# Patient Record
Sex: FEMALE | Race: OTHER | HISPANIC OR LATINO | URBAN - METROPOLITAN AREA
[De-identification: names, ages, dates, MRNs, and addresses within clinical notes are randomized per-mention and may not be internally consistent; named-entity substitution may affect disease eponyms.]

---

## 2024-04-07 ENCOUNTER — INPATIENT (INPATIENT)
Facility: HOSPITAL | Age: 89
LOS: 1 days | Discharge: HOME HEALTH | DRG: 563 | End: 2024-04-09
Attending: SURGERY | Admitting: SURGERY
Payer: MEDICARE

## 2024-04-07 VITALS
HEIGHT: 55 IN | TEMPERATURE: 98 F | DIASTOLIC BLOOD PRESSURE: 76 MMHG | HEART RATE: 85 BPM | RESPIRATION RATE: 18 BRPM | OXYGEN SATURATION: 97 % | WEIGHT: 106.92 LBS | SYSTOLIC BLOOD PRESSURE: 186 MMHG

## 2024-04-07 DIAGNOSIS — S22.49XA MULTIPLE FRACTURES OF RIBS, UNSPECIFIED SIDE, INITIAL ENCOUNTER FOR CLOSED FRACTURE: ICD-10-CM

## 2024-04-07 LAB
ABO RH CONFIRMATION: SIGNIFICANT CHANGE UP
ALBUMIN SERPL ELPH-MCNC: 3.2 G/DL — LOW (ref 3.3–5)
ALP SERPL-CCNC: 100 U/L — SIGNIFICANT CHANGE UP (ref 40–120)
ALT FLD-CCNC: 17 U/L — SIGNIFICANT CHANGE UP (ref 12–78)
ANION GAP SERPL CALC-SCNC: 6 MMOL/L — SIGNIFICANT CHANGE UP (ref 5–17)
APTT BLD: 35.3 SEC — SIGNIFICANT CHANGE UP (ref 24.5–35.6)
AST SERPL-CCNC: 23 U/L — SIGNIFICANT CHANGE UP (ref 15–37)
BASOPHILS # BLD AUTO: 0.02 K/UL — SIGNIFICANT CHANGE UP (ref 0–0.2)
BASOPHILS NFR BLD AUTO: 0.2 % — SIGNIFICANT CHANGE UP (ref 0–2)
BILIRUB SERPL-MCNC: 0.5 MG/DL — SIGNIFICANT CHANGE UP (ref 0.2–1.2)
BLD GP AB SCN SERPL QL: SIGNIFICANT CHANGE UP
BUN SERPL-MCNC: 20 MG/DL — SIGNIFICANT CHANGE UP (ref 7–23)
CALCIUM SERPL-MCNC: 9.4 MG/DL — SIGNIFICANT CHANGE UP (ref 8.5–10.1)
CHLORIDE SERPL-SCNC: 106 MMOL/L — SIGNIFICANT CHANGE UP (ref 96–108)
CO2 SERPL-SCNC: 25 MMOL/L — SIGNIFICANT CHANGE UP (ref 22–31)
CREAT SERPL-MCNC: 0.79 MG/DL — SIGNIFICANT CHANGE UP (ref 0.5–1.3)
EGFR: 66 ML/MIN/1.73M2 — SIGNIFICANT CHANGE UP
EOSINOPHIL # BLD AUTO: 0.06 K/UL — SIGNIFICANT CHANGE UP (ref 0–0.5)
EOSINOPHIL NFR BLD AUTO: 0.6 % — SIGNIFICANT CHANGE UP (ref 0–6)
GLUCOSE SERPL-MCNC: 177 MG/DL — HIGH (ref 70–99)
HCT VFR BLD CALC: 35 % — SIGNIFICANT CHANGE UP (ref 34.5–45)
HGB BLD-MCNC: 11.2 G/DL — LOW (ref 11.5–15.5)
IMM GRANULOCYTES NFR BLD AUTO: 1.7 % — HIGH (ref 0–0.9)
INR BLD: 0.97 RATIO — SIGNIFICANT CHANGE UP (ref 0.85–1.18)
LYMPHOCYTES # BLD AUTO: 1.03 K/UL — SIGNIFICANT CHANGE UP (ref 1–3.3)
LYMPHOCYTES # BLD AUTO: 9.8 % — LOW (ref 13–44)
MCHC RBC-ENTMCNC: 28.3 PG — SIGNIFICANT CHANGE UP (ref 27–34)
MCHC RBC-ENTMCNC: 32 GM/DL — SIGNIFICANT CHANGE UP (ref 32–36)
MCV RBC AUTO: 88.4 FL — SIGNIFICANT CHANGE UP (ref 80–100)
MONOCYTES # BLD AUTO: 0.62 K/UL — SIGNIFICANT CHANGE UP (ref 0–0.9)
MONOCYTES NFR BLD AUTO: 5.9 % — SIGNIFICANT CHANGE UP (ref 2–14)
NEUTROPHILS # BLD AUTO: 8.65 K/UL — HIGH (ref 1.8–7.4)
NEUTROPHILS NFR BLD AUTO: 81.8 % — HIGH (ref 43–77)
PLATELET # BLD AUTO: 157 K/UL — SIGNIFICANT CHANGE UP (ref 150–400)
POTASSIUM SERPL-MCNC: 3.6 MMOL/L — SIGNIFICANT CHANGE UP (ref 3.5–5.3)
POTASSIUM SERPL-SCNC: 3.6 MMOL/L — SIGNIFICANT CHANGE UP (ref 3.5–5.3)
PROT SERPL-MCNC: 7.2 GM/DL — SIGNIFICANT CHANGE UP (ref 6–8.3)
PROTHROM AB SERPL-ACNC: 11 SEC — SIGNIFICANT CHANGE UP (ref 9.5–13)
RBC # BLD: 3.96 M/UL — SIGNIFICANT CHANGE UP (ref 3.8–5.2)
RBC # FLD: 15.4 % — HIGH (ref 10.3–14.5)
SODIUM SERPL-SCNC: 137 MMOL/L — SIGNIFICANT CHANGE UP (ref 135–145)
WBC # BLD: 10.56 K/UL — HIGH (ref 3.8–10.5)
WBC # FLD AUTO: 10.56 K/UL — HIGH (ref 3.8–10.5)

## 2024-04-07 PROCEDURE — 99285 EMERGENCY DEPT VISIT HI MDM: CPT | Mod: FS,57

## 2024-04-07 PROCEDURE — 73060 X-RAY EXAM OF HUMERUS: CPT | Mod: 26,RT

## 2024-04-07 PROCEDURE — 36415 COLL VENOUS BLD VENIPUNCTURE: CPT

## 2024-04-07 PROCEDURE — 71250 CT THORAX DX C-: CPT | Mod: 26,MC

## 2024-04-07 PROCEDURE — 70450 CT HEAD/BRAIN W/O DYE: CPT | Mod: 26,MC

## 2024-04-07 PROCEDURE — 83735 ASSAY OF MAGNESIUM: CPT

## 2024-04-07 PROCEDURE — 97163 PT EVAL HIGH COMPLEX 45 MIN: CPT | Mod: GP

## 2024-04-07 PROCEDURE — 73030 X-RAY EXAM OF SHOULDER: CPT | Mod: 26,RT

## 2024-04-07 PROCEDURE — 23650 CLTX SHO DSLC W/MNPJ WO ANES: CPT | Mod: 54,RT

## 2024-04-07 PROCEDURE — 73020 X-RAY EXAM OF SHOULDER: CPT | Mod: 26,XE,RT

## 2024-04-07 PROCEDURE — 83036 HEMOGLOBIN GLYCOSYLATED A1C: CPT

## 2024-04-07 PROCEDURE — 97116 GAIT TRAINING THERAPY: CPT | Mod: GP

## 2024-04-07 PROCEDURE — 80048 BASIC METABOLIC PNL TOTAL CA: CPT

## 2024-04-07 PROCEDURE — 84100 ASSAY OF PHOSPHORUS: CPT

## 2024-04-07 PROCEDURE — 74177 CT ABD & PELVIS W/CONTRAST: CPT | Mod: 26,MC

## 2024-04-07 PROCEDURE — 73521 X-RAY EXAM HIPS BI 2 VIEWS: CPT | Mod: 26

## 2024-04-07 PROCEDURE — 85027 COMPLETE CBC AUTOMATED: CPT

## 2024-04-07 PROCEDURE — 72125 CT NECK SPINE W/O DYE: CPT | Mod: 26,MC

## 2024-04-07 PROCEDURE — 71045 X-RAY EXAM CHEST 1 VIEW: CPT

## 2024-04-07 RX ORDER — MORPHINE SULFATE 50 MG/1
2 CAPSULE, EXTENDED RELEASE ORAL ONCE
Refills: 0 | Status: DISCONTINUED | OUTPATIENT
Start: 2024-04-07 | End: 2024-04-07

## 2024-04-07 RX ORDER — GABAPENTIN 400 MG/1
100 CAPSULE ORAL THREE TIMES A DAY
Refills: 0 | Status: DISCONTINUED | OUTPATIENT
Start: 2024-04-07 | End: 2024-04-09

## 2024-04-07 RX ORDER — LIDOCAINE 4 G/100G
1 CREAM TOPICAL DAILY
Refills: 0 | Status: DISCONTINUED | OUTPATIENT
Start: 2024-04-07 | End: 2024-04-09

## 2024-04-07 RX ORDER — ACETAMINOPHEN 500 MG
650 TABLET ORAL ONCE
Refills: 0 | Status: COMPLETED | OUTPATIENT
Start: 2024-04-07 | End: 2024-04-07

## 2024-04-07 RX ORDER — ACETAMINOPHEN 500 MG
650 TABLET ORAL EVERY 6 HOURS
Refills: 0 | Status: DISCONTINUED | OUTPATIENT
Start: 2024-04-07 | End: 2024-04-09

## 2024-04-07 RX ORDER — ONDANSETRON 8 MG/1
4 TABLET, FILM COATED ORAL EVERY 6 HOURS
Refills: 0 | Status: DISCONTINUED | OUTPATIENT
Start: 2024-04-07 | End: 2024-04-09

## 2024-04-07 RX ORDER — IBUPROFEN 200 MG
400 TABLET ORAL EVERY 6 HOURS
Refills: 0 | Status: DISCONTINUED | OUTPATIENT
Start: 2024-04-07 | End: 2024-04-09

## 2024-04-07 RX ADMIN — Medication 400 MILLIGRAM(S): at 21:24

## 2024-04-07 RX ADMIN — LIDOCAINE 1 PATCH: 4 CREAM TOPICAL at 19:35

## 2024-04-07 RX ADMIN — MORPHINE SULFATE 2 MILLIGRAM(S): 50 CAPSULE, EXTENDED RELEASE ORAL at 15:10

## 2024-04-07 RX ADMIN — Medication 650 MILLIGRAM(S): at 19:30

## 2024-04-07 RX ADMIN — LIDOCAINE 1 PATCH: 4 CREAM TOPICAL at 18:33

## 2024-04-07 RX ADMIN — Medication 650 MILLIGRAM(S): at 18:33

## 2024-04-07 RX ADMIN — Medication 400 MILLIGRAM(S): at 21:54

## 2024-04-07 RX ADMIN — GABAPENTIN 100 MILLIGRAM(S): 400 CAPSULE ORAL at 21:24

## 2024-04-07 NOTE — ED PROVIDER NOTE - CONSTITUTIONAL, MLM
normal... Frail, elderly appearing, awake, alert, oriented to person, place, time/situation and in no apparent distress.

## 2024-04-07 NOTE — ED ADULT TRIAGE NOTE - CHIEF COMPLAINT QUOTE
pt presents to Ed with complaints of mechanical trip and fall at a family party. witnessed by son. denies head strike and blood thinners. endorsing right shoulder pain. + deformity to shoulder.

## 2024-04-07 NOTE — H&P ADULT - HISTORY OF PRESENT ILLNESS
89yo F w/ no pmh (arthritis but takes no medications at home), surg hx of inguinal hernia surgery presents s/p mechanical fall at family party. Patient lives with her son and grandsons. Patient denies any dizziness or lightheadedness at the time of fall. Patient fell on her right side of body, no head trauma, no LOC, not on blood thinners. Currently complains of pain at right shoulder and chest wall area. Denies fever/chills, shortness of breath, chest pain, abd pain/n/v, numbness/tingling. VS reviewed, on room air saturating 94%

## 2024-04-07 NOTE — H&P ADULT - NSHPLABSRESULTS_GEN_ALL_CORE
LABS:                        11.2   10.56 )-----------( 157      ( 07 Apr 2024 15:02 )             35.0     07 Apr 2024 15:02    137    |  106    |  20     ----------------------------<  177    3.6     |  25     |  0.79     Ca    9.4        07 Apr 2024 15:02    TPro  7.2    /  Alb  3.2    /  TBili  0.5    /  DBili  x      /  AST  23     /  ALT  17     /  AlkPhos  100    07 Apr 2024 15:02    PT/INR - ( 07 Apr 2024 15:02 )   PT: 11.0 sec;   INR: 0.97 ratio         PTT - ( 07 Apr 2024 15:02 )  PTT:35.3 sec    < from: CT Cervical Spine No Cont (04.07.24 @ 14:29) >    IMPRESSION:    Brain CT: Age-appropriate involutional and ischemic gliotic changes.   Right-sided phthisis bulbi. No hemorrhage.      Cervical spine CT: No acute fractures. Degenerative changes. Marked   osteopenia.      < end of copied text >    < from: CT Chest No Cont (04.07.24 @ 14:31) >      IMPRESSION:    Right shoulder dislocation.    Angulation of the right second and fourth through seventh ribs raising   the possibility ofacute fractures. Clinical correlation is necessary.   There is also similar abnormality of the left anterior fourth rib.      < end of copied text >

## 2024-04-07 NOTE — ED ADULT NURSE NOTE - NSFALLHARMRISKINTERV_ED_ALL_ED
Assistance OOB with selected safe patient handling equipment if applicable/Assistance with ambulation/Communicate risk of Fall with Harm to all staff, patient, and family/Monitor gait and stability/Provide visual cue: red socks, yellow wristband, yellow gown, etc/Reinforce activity limits and safety measures with patient and family/Bed in lowest position, wheels locked, appropriate side rails in place/Call bell, personal items and telephone in reach/Instruct patient to call for assistance before getting out of bed/chair/stretcher/Non-slip footwear applied when patient is off stretcher/[place] to call system/Physically safe environment - no spills, clutter or unnecessary equipment/Purposeful Proactive Rounding/Room/bathroom lighting operational, light cord in reach

## 2024-04-07 NOTE — ED PROVIDER NOTE - PROGRESS NOTE DETAILS
CT shows  Angulation of the anterior aspect of the right second, fourth,   fifth, sixth and seventh ribs and the anterior left fourth rib. Right   shoulder glenohumeral dislocation.  I, YEFRI Urias have personally discussed with the consulting BARRINGTON/attending.  Service: trauma  Name: Dr. Salguero  Plan: Will see in ED, rec step down

## 2024-04-07 NOTE — CONSULT NOTE ADULT - SUBJECTIVE AND OBJECTIVE BOX
Patient is a 89y old  Female who presents with a chief complaint of s/p mechanical fall, rib fx and r. shoulder dislocation (07 Apr 2024 16:32)      BRIEF HOSPITAL COURSE: 89yo F w/ no pmh (arthritis but takes no medications at home), surg hx of inguinal hernia surgery presents s/p mechanical fall at family party. Patient lives with her son and grandsons. Patient denies any dizziness or lightheadedness at the time of fall. Patient fell on her right side of body, no head trauma, no LOC, not on blood thinners. Currently complains of pain at right shoulder and chest wall area. Denies fever/chills, shortness of breath, chest pain, abd pain/n/v, numbness/tingling. VS reviewed, on room air saturating 94%    Events last 24 hours: She is now s/p right shoulder reduction. She is resting comfortably in bed. No acute complaints     PAST MEDICAL & SURGICAL HISTORY:    Allergies    No Known Allergies    Intolerances      FAMILY HISTORY:      Review of Systems: As noted in HPI       Medications:        acetaminophen     Tablet .. 650 milliGRAM(s) Oral every 6 hours PRN  gabapentin 100 milliGRAM(s) Oral three times a day  ibuprofen  Tablet. 400 milliGRAM(s) Oral every 6 hours PRN  ondansetron Injectable 4 milliGRAM(s) IV Push every 6 hours PRN        lidocaine   4% Patch 1 Patch Transdermal daily            ICU Vital Signs Last 24 Hrs  T(C): 36.9 (07 Apr 2024 17:11), Max: 36.9 (07 Apr 2024 17:11)  T(F): 98.4 (07 Apr 2024 17:11), Max: 98.4 (07 Apr 2024 17:11)  HR: 78 (07 Apr 2024 17:11) (78 - 85)  BP: 130/57 (07 Apr 2024 17:11) (130/57 - 186/76)  BP(mean): 80 (07 Apr 2024 17:11) (80 - 80)  ABP: --  ABP(mean): --  RR: 17 (07 Apr 2024 17:11) (17 - 18)  SpO2: 94% (07 Apr 2024 17:11) (94% - 97%)    O2 Parameters below as of 07 Apr 2024 17:11  Patient On (Oxygen Delivery Method): room air          Vital Signs Last 24 Hrs  T(C): 36.9 (07 Apr 2024 17:11), Max: 36.9 (07 Apr 2024 17:11)  T(F): 98.4 (07 Apr 2024 17:11), Max: 98.4 (07 Apr 2024 17:11)  HR: 78 (07 Apr 2024 17:11) (78 - 85)  BP: 130/57 (07 Apr 2024 17:11) (130/57 - 186/76)  BP(mean): 80 (07 Apr 2024 17:11) (80 - 80)  RR: 17 (07 Apr 2024 17:11) (17 - 18)  SpO2: 94% (07 Apr 2024 17:11) (94% - 97%)    Parameters below as of 07 Apr 2024 17:11  Patient On (Oxygen Delivery Method): room air            I&O's Detail        LABS:                        11.2   10.56 )-----------( 157      ( 07 Apr 2024 15:02 )             35.0     04-07    137  |  106  |  20  ----------------------------<  177<H>  3.6   |  25  |  0.79    Ca    9.4      07 Apr 2024 15:02    TPro  7.2  /  Alb  3.2<L>  /  TBili  0.5  /  DBili  x   /  AST  23  /  ALT  17  /  AlkPhos  100  04-07          CAPILLARY BLOOD GLUCOSE        PT/INR - ( 07 Apr 2024 15:02 )   PT: 11.0 sec;   INR: 0.97 ratio         PTT - ( 07 Apr 2024 15:02 )  PTT:35.3 sec  Urinalysis Basic - ( 07 Apr 2024 15:02 )    Color: x / Appearance: x / SG: x / pH: x  Gluc: 177 mg/dL / Ketone: x  / Bili: x / Urobili: x   Blood: x / Protein: x / Nitrite: x   Leuk Esterase: x / RBC: x / WBC x   Sq Epi: x / Non Sq Epi: x / Bacteria: x      CULTURES:      Physical Examination:    General: Resting comfortably in bed.     HEENT: NC/AT. No right eye     PULM: Normal respiratory effort     CVS: RRR     ABD: Soft, nondistended     EXT: Limited ROM right arm. Normal sensory function. Palpable pulses in all 4 extremities     SKIN: Warm and well perfused, no rashes noted.    RADIOLOGY: < from: CT Abdomen and Pelvis w/ IV Cont (04.07.24 @ 15:54) >  IMPRESSION:  No acute traumatic injury.  Biliary and pancreatic ductal dilatation without evidence of obstructing   mass or stone.    < from: Xray Shoulder Post Reduction 1 View, Right (04.07.24 @ 15:58) >    IMPRESSION:   Humeral head relocated within glenoid fossae.  Bone fragment/fracture between the acromion and humeral head greater   tuberosity likely arising from humeral head Hill-Sachs deformity.    < from: CT Chest No Cont (04.07.24 @ 14:31) >  IMPRESSION:    Right shoulder dislocation.    Angulation of the right second and fourth through seventh ribs raising   the possibility ofacute fractures. Clinical correlation is necessary.   There is also similar abnormality of the left anterior fourth rib.    < from: CT Head No Cont (04.07.24 @ 14:29) >  IMPRESSION:    Brain CT: Age-appropriate involutional and ischemic gliotic changes.   Right-sided phthisis bulbi. No hemorrhage.      Cervical spine CT: No acute fractures. Degenerative changes. Marked   osteopenia.

## 2024-04-07 NOTE — PHARMACOTHERAPY INTERVENTION NOTE - COMMENTS
Medication history complete. Medications and allergies reviewed with patient's grandson, Festus at bedside and confirmed with . Per family, patient is not currently taking prescription medication.

## 2024-04-07 NOTE — CONSULT NOTE ADULT - ASSESSMENT
89yo F presents s/p fall, no head trauma, no loc, no anticoag/antithrombotics  pan CT demonstrated R. shoulder dislocation (s/p reduction) and R. 2 and 4-7 angulated rib fx    Plan:  -Vitals HDS. Adequate oxygenation on room air   -S/p right shoulder reduction. Ortho team following, keep arm immobilized in sling   -Multimodal pain control with Tylenol, Motrin, lidocaine patch and gabapentin   -Encourage incentive spirometry as tolerated. Will need PT eval in morning.   -Diet to be advanced as tolerated   -SCD for DVT PPX. No chemical PPX at this time.   -Admitted to SICU. No thoracic intervention at this time. Will continue to follow   -Case discussed with Dr. Suleiman Fernando     TIME SPENT: 40 minutes   Time spent evaluating/treating patient, reviewing data/labs/imaging, discussing case with multidisciplinary team, discussing plan/goals of care with patient/family. Non-inclusive of procedure time. Date of entry of this note is equal to the date of services rendered.

## 2024-04-07 NOTE — ED PROVIDER NOTE - CLINICAL SUMMARY MEDICAL DECISION MAKING FREE TEXT BOX
In summary this is 89-year-old female who presents for chief complaint of right shoulder and right chest wall pain after a fall.  Patient with a right shoulder deformity, right rib fractures on CT scan.  Shoulder was relocated in the department.  Patient was given morphine for pain.  Patient admitted to trauma service, SICU under Dr. Salguero.

## 2024-04-07 NOTE — PATIENT PROFILE ADULT - VISION (WITH CORRECTIVE LENSES IF THE PATIENT USUALLY WEARS THEM):
no right eyeball no right eyeball/Partially impaired: cannot see medication labels or newsprint, but can see obstacles in path, and the surrounding layout; can count fingers at arm's length

## 2024-04-07 NOTE — PATIENT PROFILE ADULT - FALL HARM RISK - HARM RISK INTERVENTIONS

## 2024-04-07 NOTE — ED PROVIDER NOTE - OBJECTIVE STATEMENT
89 y/o female w/ a PMHx of presents to the ED via EMS s/p witnessed mechanical fall. Pt son reports pt tripped and fell while at a family party, fell on her right side, denies head strike or LOC. Pt c/o right shoulder pain. Denies CP, SOB, HA, n/v/d, abd pain, or recent surgeries. Pt had 2 Tylenol PTA. No other complaints at this time. NKDA.

## 2024-04-07 NOTE — ED PROVIDER NOTE - CARE PLAN
1 Principal Discharge DX:	Rib fractures   Principal Discharge DX:	Rib fractures  Secondary Diagnosis:	Dislocation of right shoulder joint

## 2024-04-07 NOTE — ED ADULT NURSE NOTE - OBJECTIVE STATEMENT
Pt coming into the ED by ambulance with c/o accidental fall. Pt is A&OX1. As per EMS note pt tripped at fell at a party. Pt not on blood thinners. Pt endorsing pain to the right shoulder. Unable to obtain much history due to poor historian awaiting for pt's son to c

## 2024-04-07 NOTE — H&P ADULT - ASSESSMENT
89yo F presents s/p fall, no head trauma, no loc, no anticoag/antithrombotics  panCT demonstrated R. shoulder dislocation (s/p reduction) and R. 2 and 4-7 angulated rib fx  Saturating 94% on room air    Plan:  - pain control prn  - monitor VS  - CT surgery consult  - diet: Reg  - antiemetic control prn  - orthopedic surgery for right shoulder dislocation  - hold chemical DVT PPX, SCD only  - encourage IS/OOB  - Physical therapy and social work    Plan discussed with surgery team and attending, Dr. Salguero

## 2024-04-07 NOTE — H&P ADULT - NSHPPHYSICALEXAM_GEN_ALL_CORE
PHYSICAL EXAM:  GENERAL: NAD, AOx3, well developed  HEAD:  Atraumatic, Normocephalic  EYES: no right eye. EOMI, conjunctiva and sclera clear  NECK: Supple, No JVD  CHEST/LUNG: Unlabored respirations b/l  CHEST WALL: tender to palpation at right anterior chest wall area. no ecchymosis/hematoma/external injury  HEART: S1 and S2 normal  ABDOMEN: soft, nondistended, nontender  EXTREMITIES:  2+ Peripheral Pulses, brisk capillary refill. No clubbing, cyanosis, or edema  NERVOUS SYSTEM:  AO X3, speech clear. No deficits   MSK: restricted ROM on right arm, on a sling. no deformities. otherwise FROM  SKIN: warm to touch. No rashes or lesions

## 2024-04-08 LAB
A1C WITH ESTIMATED AVERAGE GLUCOSE RESULT: 5.6 % — SIGNIFICANT CHANGE UP (ref 4–5.6)
ANION GAP SERPL CALC-SCNC: 4 MMOL/L — LOW (ref 5–17)
BUN SERPL-MCNC: 17 MG/DL — SIGNIFICANT CHANGE UP (ref 7–23)
CALCIUM SERPL-MCNC: 9 MG/DL — SIGNIFICANT CHANGE UP (ref 8.5–10.1)
CHLORIDE SERPL-SCNC: 111 MMOL/L — HIGH (ref 96–108)
CO2 SERPL-SCNC: 27 MMOL/L — SIGNIFICANT CHANGE UP (ref 22–31)
CREAT SERPL-MCNC: 0.88 MG/DL — SIGNIFICANT CHANGE UP (ref 0.5–1.3)
EGFR: 58 ML/MIN/1.73M2 — LOW
ESTIMATED AVERAGE GLUCOSE: 114 MG/DL — SIGNIFICANT CHANGE UP (ref 68–114)
GLUCOSE SERPL-MCNC: 106 MG/DL — HIGH (ref 70–99)
HCT VFR BLD CALC: 31.8 % — LOW (ref 34.5–45)
HGB BLD-MCNC: 10.1 G/DL — LOW (ref 11.5–15.5)
MAGNESIUM SERPL-MCNC: 2.3 MG/DL — SIGNIFICANT CHANGE UP (ref 1.6–2.6)
MCHC RBC-ENTMCNC: 27.7 PG — SIGNIFICANT CHANGE UP (ref 27–34)
MCHC RBC-ENTMCNC: 31.8 GM/DL — LOW (ref 32–36)
MCV RBC AUTO: 87.1 FL — SIGNIFICANT CHANGE UP (ref 80–100)
PHOSPHATE SERPL-MCNC: 2.6 MG/DL — SIGNIFICANT CHANGE UP (ref 2.5–4.5)
PLATELET # BLD AUTO: 144 K/UL — LOW (ref 150–400)
POTASSIUM SERPL-MCNC: 4 MMOL/L — SIGNIFICANT CHANGE UP (ref 3.5–5.3)
POTASSIUM SERPL-SCNC: 4 MMOL/L — SIGNIFICANT CHANGE UP (ref 3.5–5.3)
RBC # BLD: 3.65 M/UL — LOW (ref 3.8–5.2)
RBC # FLD: 15.5 % — HIGH (ref 10.3–14.5)
SODIUM SERPL-SCNC: 142 MMOL/L — SIGNIFICANT CHANGE UP (ref 135–145)
WBC # BLD: 5.36 K/UL — SIGNIFICANT CHANGE UP (ref 3.8–10.5)
WBC # FLD AUTO: 5.36 K/UL — SIGNIFICANT CHANGE UP (ref 3.8–10.5)

## 2024-04-08 PROCEDURE — 99232 SBSQ HOSP IP/OBS MODERATE 35: CPT

## 2024-04-08 PROCEDURE — 71045 X-RAY EXAM CHEST 1 VIEW: CPT | Mod: 26

## 2024-04-08 RX ORDER — HEPARIN SODIUM 5000 [USP'U]/ML
5000 INJECTION INTRAVENOUS; SUBCUTANEOUS EVERY 12 HOURS
Refills: 0 | Status: DISCONTINUED | OUTPATIENT
Start: 2024-04-08 | End: 2024-04-09

## 2024-04-08 RX ADMIN — LIDOCAINE 1 PATCH: 4 CREAM TOPICAL at 08:46

## 2024-04-08 RX ADMIN — Medication 650 MILLIGRAM(S): at 21:58

## 2024-04-08 RX ADMIN — HEPARIN SODIUM 5000 UNIT(S): 5000 INJECTION INTRAVENOUS; SUBCUTANEOUS at 21:28

## 2024-04-08 RX ADMIN — HEPARIN SODIUM 5000 UNIT(S): 5000 INJECTION INTRAVENOUS; SUBCUTANEOUS at 10:58

## 2024-04-08 RX ADMIN — GABAPENTIN 100 MILLIGRAM(S): 400 CAPSULE ORAL at 13:23

## 2024-04-08 RX ADMIN — LIDOCAINE 1 PATCH: 4 CREAM TOPICAL at 06:13

## 2024-04-08 RX ADMIN — Medication 400 MILLIGRAM(S): at 09:30

## 2024-04-08 RX ADMIN — LIDOCAINE 1 PATCH: 4 CREAM TOPICAL at 21:18

## 2024-04-08 RX ADMIN — GABAPENTIN 100 MILLIGRAM(S): 400 CAPSULE ORAL at 05:12

## 2024-04-08 RX ADMIN — Medication 400 MILLIGRAM(S): at 08:44

## 2024-04-08 RX ADMIN — Medication 650 MILLIGRAM(S): at 14:00

## 2024-04-08 RX ADMIN — GABAPENTIN 100 MILLIGRAM(S): 400 CAPSULE ORAL at 21:28

## 2024-04-08 RX ADMIN — Medication 650 MILLIGRAM(S): at 21:28

## 2024-04-08 RX ADMIN — Medication 650 MILLIGRAM(S): at 13:23

## 2024-04-08 NOTE — PHYSICAL THERAPY INITIAL EVALUATION ADULT - GENERAL OBSERVATIONS, REHAB EVAL
bladder suction; flowtrons; HM; BP cuff; pulse oxym; R UE in sling; pt rec'd in bed supine; HR 65; /48; O2 sat 94%; RR 15; pt c/o R UE pain; RN Lluvia made aware; daughter / grandson present

## 2024-04-08 NOTE — PROGRESS NOTE ADULT - SUBJECTIVE AND OBJECTIVE BOX
Pt seen and examined this am, pain controlled, shoulder in sling, thoracic surgery recommended IS and multilodal pain control, no intervention required,       Physical Exam:    GENERAL: NAD, AOx3, well developed  HEAD:  Atraumatic, Normocephalic  EYES: no right eye. EOMI, conjunctiva and sclera clear  NECK: Supple, No JVD  CHEST/LUNG: Unlabored respirations b/l  right shoulder in sling   CHEST WALL: tender to palpation at right anterior chest wall area. no ecchymosis/hematoma/external injury  HEART: S1 and S2 normal  ABDOMEN: soft, nondistended, nontender  EXTREMITIES:  2+ Peripheral Pulses, brisk capillary refill. No clubbing, cyanosis, or edema  NERVOUS SYSTEM:  AO X3, speech clear. No deficits   MSK: restricted ROM on right arm, on a sling. no deformities. otherwise FROM  SKIN: warm to touch. No rashes or lesions      Vitals:  T(C): 36.9 (04-07 @ 17:11), Max: 36.9 (04-07 @ 17:11)  HR: 56 (04-08 @ 04:00) (56 - 85)  BP: 110/49 (04-08 @ 04:00) (98/46 - 186/76)  RR: 15 (04-08 @ 04:00) (11 - 18)  SpO2: 97% (04-08 @ 04:00) (92% - 97%)        04-07 @ 15:02                    11.2  CBC: 10.56>)-------(<157                     35.0                 137 | 106 | 20    CMP:  ----------------------< 177               3.6 | 25 | 0.79                      Ca:9.4  Phos:-  Mg:-               0.5|      |23        LFTs:  ------|100|-----             -|      |-         from: CT Cervical Spine No Cont (04.07.24 @ 14:29) >    IMPRESSION:    Brain CT: Age-appropriate involutional and ischemic gliotic changes.   Right-sided phthisis bulbi. No hemorrhage.      Cervical spine CT: No acute fractures. Degenerative changes. Marked   osteopenia.      < end of copied text >    < from: CT Chest No Cont (04.07.24 @ 14:31) >      IMPRESSION:    Right shoulder dislocation.    Angulation of the right second and fourth through seventh ribs raising   the possibility ofacute fractures. Clinical correlation is necessary.   There is also similar abnormality of the left anterior fourth rib.    CT A/P:  IMPRESSION:  No acute traumatic injury.  Biliary and pancreatic ductal dilatation without evidence of obstructing mass or stone.      CT spine and head:  IMPRESSION:    Brain CT: Age-appropriate involutional and ischemic gliotic changes. Right-sided phthisis bulbi. No hemorrhage.  Cervical spine CT: No acute fractures. Degenerative changes. Marked osteopenia.        Current Inpatient Medications:  acetaminophen     Tablet .. 650 milliGRAM(s) Oral every 6 hours PRN  gabapentin 100 milliGRAM(s) Oral three times a day  ibuprofen  Tablet. 400 milliGRAM(s) Oral every 6 hours PRN  lidocaine   4% Patch 1 Patch Transdermal daily  ondansetron Injectable 4 milliGRAM(s) IV Push every 6 hours PRN       tertiary survey  Patient is a 89y old  Female who presents with a chief complaint of s/p mechanical fall, rib fx and r. shoulder dislocation (08 Apr 2024 14:29)    Pt seen and examined this am, pain controlled, shoulder in sling, thoracic surgery recommended IS and multilodal pain control, no intervention required,   ROS negative except as above    TERTIARY  Physical Exam:    GENERAL: NAD, AOx3, well developed  HEAD:  Atraumatic, Normocephalic  EYES: no right eye. EOMI, conjunctiva and sclera clear  NECK: Supple, No JVD  CHEST/LUNG: Unlabored respirations b/l  right shoulder in sling   CHEST WALL: tender to palpation at right anterior chest wall area. no ecchymosis/hematoma/external injury  HEART: S1 and S2 normal  ABDOMEN: soft, nondistended, nontender  EXTREMITIES:  2+ Peripheral Pulses, brisk capillary refill. No clubbing, cyanosis, or edema  NERVOUS SYSTEM:  AO X3, speech clear. No deficits   MSK: restricted ROM on right arm, on a sling. no deformities. otherwise FROM  SKIN: warm to touch. No rashes or lesions      Vitals:  T(C): 36.9 (04-07 @ 17:11), Max: 36.9 (04-07 @ 17:11)  HR: 56 (04-08 @ 04:00) (56 - 85)  BP: 110/49 (04-08 @ 04:00) (98/46 - 186/76)  RR: 15 (04-08 @ 04:00) (11 - 18)  SpO2: 97% (04-08 @ 04:00) (92% - 97%)        04-07 @ 15:02                    11.2  CBC: 10.56>)-------(<157                     35.0                 137 | 106 | 20    CMP:  ----------------------< 177               3.6 | 25 | 0.79                      Ca:9.4  Phos:-  Mg:-               0.5|      |23        LFTs:  ------|100|-----             -|      |-         from: CT Cervical Spine No Cont (04.07.24 @ 14:29) >    IMPRESSION:    Brain CT: Age-appropriate involutional and ischemic gliotic changes.   Right-sided phthisis bulbi. No hemorrhage.      Cervical spine CT: No acute fractures. Degenerative changes. Marked   osteopenia.      < end of copied text >    < from: CT Chest No Cont (04.07.24 @ 14:31) >      IMPRESSION:    Right shoulder dislocation.    Angulation of the right second and fourth through seventh ribs raising   the possibility ofacute fractures. Clinical correlation is necessary.   There is also similar abnormality of the left anterior fourth rib.    CT A/P:  IMPRESSION:  No acute traumatic injury.  Biliary and pancreatic ductal dilatation without evidence of obstructing mass or stone.      CT spine and head:  IMPRESSION:    Brain CT: Age-appropriate involutional and ischemic gliotic changes. Right-sided phthisis bulbi. No hemorrhage.  Cervical spine CT: No acute fractures. Degenerative changes. Marked osteopenia.        Current Inpatient Medications:  acetaminophen     Tablet .. 650 milliGRAM(s) Oral every 6 hours PRN  gabapentin 100 milliGRAM(s) Oral three times a day  ibuprofen  Tablet. 400 milliGRAM(s) Oral every 6 hours PRN  lidocaine   4% Patch 1 Patch Transdermal daily  ondansetron Injectable 4 milliGRAM(s) IV Push every 6 hours PRN

## 2024-04-08 NOTE — PHYSICAL THERAPY INITIAL EVALUATION ADULT - LIVES WITH, PROFILE
son; 10 stairs inside with handrail/children son; HHA 5 days/week 10 stairs inside with handrail/children

## 2024-04-08 NOTE — PROGRESS NOTE ADULT - ASSESSMENT
89yo F presents s/p fall, no head trauma, no loc, no anticoag/antithrombotics  panCT demonstrated R. shoulder dislocation (s/p reduction) and R. 2 and 4-7 angulated rib fx      tertiary w no additional finding     Plan:  - pain control multimodal  - monitor VS  - CT surgery on board, no surgical intervention  - follow p AM CXR  - diet: Reg  - antiemetic control prn  - orthopedic surgery for right shoulder dislocation> s/p reduction: keep sling   - hold chemical DVT PPX, SCD only: will start chemical DVT ppx if AM h/h stable and if thoracic surgery recommends   - encourage IS/OOB  - Physical therapy and social work   89yo F presents s/p fall, no head trauma, no loc, no anticoag/antithrombotics  panCT demonstrated R. shoulder dislocation (s/p reduction) and R. 2 and 4-7 angulated rib fx      tertiary w no additional finding     Plan:  - pain control multimodal  - monitor VS  - CT surgery on board, no surgical intervention  - follow p AM CXR  - diet: Reg  - antiemetic control prn  - orthopedic surgery for right shoulder dislocation> s/p reduction: keep sling   - hold chemical DVT PPX, SCD only: will start chemical DVT ppx if AM h/h stable and if thoracic surgery recommends   - encourage IS/OOB  - Physical therapy and social work      Attending A/P:    Chart reviewed, patient examined, agree with above resident evaluation in addition to the following    pain controlled, PT eval, dc planning, OK to transfer to floor with telemetry    PLAN:  appreciate ortho recs, hospitalist consult  GI/DVT prophylaxis  home meds as appropriate  Pain control  PT, Spirometer    Pt aware of and agrees with all of the above    35 minuted of time spent on pt examination, review of relevant labs and radiologic studies, assured stabilization of pt, discussion with relevant services/providers for coordination of pt care and services

## 2024-04-08 NOTE — PROGRESS NOTE ADULT - SUBJECTIVE AND OBJECTIVE BOX
Interval History:     patient sitting in chair, no distress     c/o left shoulder pain  HPI:        History of Present Illness:   89yo F w/ no pmh (arthritis but takes no medications at home), surg hx of inguinal hernia surgery presents s/p mechanical fall at family party. Patient lives with her son and grandsons. Patient denies any dizziness or lightheadedness at the time of fall. Patient fell on her right side of body, tripping on her showes. . Currently complains of pain at right shoulder and chest wall area. Denies fever/chills, shortness of breath, chest pain, abd pain/n/v, numbness/tingling. VS reviewed, on room air saturating 94%  CT shows right shoulder dislocation, ? 3 rib fractures onright    Review of Systems:  Other Review of Systems: All other review of systems negative, except as noted in HPI    MEDICATIONS  (STANDING):  gabapentin 100 milliGRAM(s) Oral three times a day  heparin   Injectable 5000 Unit(s) SubCutaneous every 12 hours  lidocaine   4% Patch 1 Patch Transdermal daily    MEDICATIONS  (PRN):  acetaminophen     Tablet .. 650 milliGRAM(s) Oral every 6 hours PRN Temp greater or equal to 38C (100.4F), Mild Pain (1 - 3)  ibuprofen  Tablet. 400 milliGRAM(s) Oral every 6 hours PRN Moderate Pain (4 - 6)  ondansetron Injectable 4 milliGRAM(s) IV Push every 6 hours PRN Nausea    ICU Vital Signs Last 24 Hrs  T(C): 36.4 (08 Apr 2024 14:11), Max: 36.9 (07 Apr 2024 17:11)  T(F): 97.6 (08 Apr 2024 14:11), Max: 98.4 (07 Apr 2024 17:11)  HR: 66 (08 Apr 2024 12:00) (56 - 83)  BP: 101/48 (08 Apr 2024 12:00) (98/46 - 158/73)  BP(mean): 64 (08 Apr 2024 12:00) (62 - 97)  ABP: --  ABP(mean): --  RR: 17 (08 Apr 2024 12:00) (11 - 18)  SpO2: 95% (08 Apr 2024 12:00) (91% - 97%)    O2 Parameters below as of 07 Apr 2024 18:00  Patient On (Oxygen Delivery Method): room air    Physical Exam    general frail, looks great for age  HEENT nc/at   neck supple  lungs clear  cv rrr  abdomen soft, non tender  extrmeiteis right shoulder in sling  rightchest wall slight tenderness  extrmeiteis warm                            10.1   5.36  )-----------( 144      ( 08 Apr 2024 05:41 )             31.8       04-08    142  |  111<H>  |  17  ----------------------------<  106<H>  4.0   |  27  |  0.88    Ca    9.0      08 Apr 2024 05:41  Phos  2.6     04-08  Mg     2.3     04-08    TPro  7.2  /  Alb  3.2<L>  /  TBili  0.5  /  DBili  x   /  AST  23  /  ALT  17  /  AlkPhos  100  04-07    Urinalysis Basic - ( 08 Apr 2024 05:41 )    Color: x / Appearance: x / SG: x / pH: x  Gluc: 106 mg/dL / Ketone: x  / Bili: x / Urobili: x   Blood: x / Protein: x / Nitrite: x   Leuk Esterase: x / RBC: x / WBC x   Sq Epi: x / Non Sq Epi: x / Bacteria: x    DVT Prophylaxis:  Heparin subq                                                               Advanced Directives: Ful Code         Labs, Notes, Orders, radiologic studies reviewed and care coordinated with multidisciplinary team

## 2024-04-08 NOTE — PROGRESS NOTE ADULT - ASSESSMENT
· Assessment	  89yo F presents s/p fall, no head trauma, no loc, no anticoag/antithrombotics  panCT demonstrated R. shoulder dislocation (s/p reduction) and R. 2 and 4-7 angulated rib fx  Saturating 94% on room air    Plan:  - pain control prn   incentive spirometry   diet   PT   can transfer to floor

## 2024-04-09 VITALS
HEART RATE: 65 BPM | RESPIRATION RATE: 16 BRPM | TEMPERATURE: 98 F | DIASTOLIC BLOOD PRESSURE: 50 MMHG | OXYGEN SATURATION: 95 % | SYSTOLIC BLOOD PRESSURE: 120 MMHG

## 2024-04-09 LAB
ANION GAP SERPL CALC-SCNC: 5 MMOL/L — SIGNIFICANT CHANGE UP (ref 5–17)
BUN SERPL-MCNC: 17 MG/DL — SIGNIFICANT CHANGE UP (ref 7–23)
CALCIUM SERPL-MCNC: 9.1 MG/DL — SIGNIFICANT CHANGE UP (ref 8.5–10.1)
CHLORIDE SERPL-SCNC: 110 MMOL/L — HIGH (ref 96–108)
CO2 SERPL-SCNC: 26 MMOL/L — SIGNIFICANT CHANGE UP (ref 22–31)
CREAT SERPL-MCNC: 0.7 MG/DL — SIGNIFICANT CHANGE UP (ref 0.5–1.3)
EGFR: 77 ML/MIN/1.73M2 — SIGNIFICANT CHANGE UP
GLUCOSE SERPL-MCNC: 102 MG/DL — HIGH (ref 70–99)
HCT VFR BLD CALC: 32.5 % — LOW (ref 34.5–45)
HGB BLD-MCNC: 10.4 G/DL — LOW (ref 11.5–15.5)
MAGNESIUM SERPL-MCNC: 2.4 MG/DL — SIGNIFICANT CHANGE UP (ref 1.6–2.6)
MCHC RBC-ENTMCNC: 28 PG — SIGNIFICANT CHANGE UP (ref 27–34)
MCHC RBC-ENTMCNC: 32 GM/DL — SIGNIFICANT CHANGE UP (ref 32–36)
MCV RBC AUTO: 87.4 FL — SIGNIFICANT CHANGE UP (ref 80–100)
PHOSPHATE SERPL-MCNC: 2 MG/DL — LOW (ref 2.5–4.5)
PLATELET # BLD AUTO: 151 K/UL — SIGNIFICANT CHANGE UP (ref 150–400)
POTASSIUM SERPL-MCNC: 4 MMOL/L — SIGNIFICANT CHANGE UP (ref 3.5–5.3)
POTASSIUM SERPL-SCNC: 4 MMOL/L — SIGNIFICANT CHANGE UP (ref 3.5–5.3)
RBC # BLD: 3.72 M/UL — LOW (ref 3.8–5.2)
RBC # FLD: 15.6 % — HIGH (ref 10.3–14.5)
SODIUM SERPL-SCNC: 141 MMOL/L — SIGNIFICANT CHANGE UP (ref 135–145)
WBC # BLD: 4.15 K/UL — SIGNIFICANT CHANGE UP (ref 3.8–10.5)
WBC # FLD AUTO: 4.15 K/UL — SIGNIFICANT CHANGE UP (ref 3.8–10.5)

## 2024-04-09 PROCEDURE — 99239 HOSP IP/OBS DSCHRG MGMT >30: CPT

## 2024-04-09 RX ADMIN — Medication 400 MILLIGRAM(S): at 08:01

## 2024-04-09 RX ADMIN — GABAPENTIN 100 MILLIGRAM(S): 400 CAPSULE ORAL at 05:23

## 2024-04-09 RX ADMIN — HEPARIN SODIUM 5000 UNIT(S): 5000 INJECTION INTRAVENOUS; SUBCUTANEOUS at 11:18

## 2024-04-09 RX ADMIN — Medication 400 MILLIGRAM(S): at 08:56

## 2024-04-09 RX ADMIN — GABAPENTIN 100 MILLIGRAM(S): 400 CAPSULE ORAL at 14:24

## 2024-04-09 RX ADMIN — Medication 400 MILLIGRAM(S): at 14:28

## 2024-04-09 RX ADMIN — LIDOCAINE 1 PATCH: 4 CREAM TOPICAL at 11:18

## 2024-04-09 NOTE — DISCHARGE NOTE PROVIDER - CARE PROVIDER_API CALL
Sun Do  Surgical Critical Care  [address] [place] [address]  Phone: [phone]  Fax: [phone]  Follow Up Time: Routine   Jose Green [place]  Orthopaedic Surgery  [address] [place] [address], [facility] [place] [address]  Phone: [phone]  Follow Up Time: 1 week   Justice Duran  Orthopaedic Surgery  [address] [facility], [address]  [place] [address]  Phone: [phone]  Fax: [phone]  Follow Up Time: 1 week

## 2024-04-09 NOTE — PROGRESS NOTE ADULT - SUBJECTIVE AND OBJECTIVE BOX
Pt s/p mechanical fall, rib fx and r. shoulder dislocation (2024 14:29)    Pt seen and examined this am, pain controlled, shoulder in sling, using IS and on multilodal pain control, downgraded to floor yesterday , h/h stable, started on chemical DVT ppx   ROS negative except as above      Physical Exam:    GENERAL: NAD, AOx3, well developed  HEAD:  Atraumatic, Normocephalic  EYES: no right eye. EOMI, conjunctiva and sclera clear  NECK: Supple, No JVD  CHEST/LUNG: Unlabored respirations b/l  right shoulder in sling   CHEST WALL: tender to palpation at right anterior chest wall area. no ecchymosis/hematoma/external injury  HEART: S1 and S2 normal  ABDOMEN: soft, nondistended, nontender  EXTREMITIES:  2+ Peripheral Pulses, brisk capillary refill. No clubbing, cyanosis, or edema  NERVOUS SYSTEM:  AO X3, speech clear. No deficits   MSK: restricted ROM on right arm, on a sling. no deformities. otherwise FROM  SKIN: warm to touch. No rashes or lesions            Vitals:  T(C): 36.7 ( @ 00:00), Max: 36.7 (- @ 00:00)  HR: 65 ( @ 00:00) (65 - 81)  BP: 111/51 ( @ 00:00) (101/48 - 120/74)  RR: 16 ( @ 00:00) (15 - 18)  SpO2: 93% ( @ 00:00) (91% - 95%)       @ 07:01  -   @ 06:46  --------------------------------------------------------  IN:  Total IN: 0 mL    OUT:    Voided (mL): 400 mL  Total OUT: 400 mL    Total NET: -400 mL           @ 05:41                    10.1  CBC: 5.36>)-------(<144                     31.8                 142 | 111 | 17    CMP:  ----------------------< 106               4.0 | 27 | 0.88                      Ca:9.0  Phos:2.6  M.3               -|      |-        LFTs:  ------|-|-----             -|      |-            Current Inpatient Medications:  acetaminophen     Tablet .. 650 milliGRAM(s) Oral every 6 hours PRN  gabapentin 100 milliGRAM(s) Oral three times a day  heparin   Injectable 5000 Unit(s) SubCutaneous every 12 hours  ibuprofen  Tablet. 400 milliGRAM(s) Oral every 6 hours PRN  lidocaine   4% Patch 1 Patch Transdermal daily  ondansetron Injectable 4 milliGRAM(s) IV Push every 6 hours PRN       Pt s/p mechanical fall, rib fx and r. shoulder dislocation (2024 14:29)    Pt seen and examined this am, pain controlled, shoulder in sling, using IS and on multilodal pain control, downgraded to floor yesterday , h/h stable, started on chemical DVT ppx   ROS negative except as above      Physical Exam:    GENERAL: NAD, AOx3, well developed  HEAD:  Atraumatic, Normocephalic  EYES: no right eye. EOMI, conjunctiva and sclera clear  NECK: Supple, No JVD  CHEST/LUNG: Unlabored respirations b/l  right shoulder in sling   CHEST WALL: tender to palpation at right anterior chest wall area. no ecchymosis/hematoma/external injury  HEART: S1 and S2 normal  ABDOMEN: soft, nondistended, nontender  EXTREMITIES:  2+ Peripheral Pulses, brisk capillary refill. No clubbing, cyanosis, or edema  NERVOUS SYSTEM:  AO X3, speech clear. No deficits   MSK: restricted ROM on right arm, on a sling. no deformities. otherwise FROM  SKIN: warm to touch. No rashes or lesions            Vitals:  T(C): 36.7 ( @ 00:00), Max: 36.7 (- @ 00:00)  HR: 65 ( @ 00:00) (65 - 81)  BP: 111/51 ( @ 00:00) (101/48 - 120/74)  RR: 16 ( @ 00:00) (15 - 18)  SpO2: 93% ( @ 00:00) (91% - 95%)       @ 07:01  -   @ 06:46  --------------------------------------------------------  IN:  Total IN: 0 mL    OUT:    Voided (mL): 400 mL  Total OUT: 400 mL    Total NET: -400 mL           @ 05:41                    10.1  CBC: 5.36>)-------(<144                     31.8                 142 | 111 | 17    CMP:  ----------------------< 106               4.0 | 27 | 0.88                      Ca:9.0  Phos:2.6  M.3               -|      |-        LFTs:  ------|-|-----             -|      |-      Interval imaging    CXR :      There is mild symmetric degeneration concentrated in the outer hips. There is lower lumbar facet degeneration. No fracture.  AP chest on 2024 at 7:53 AM. CAT scan one day prior showed multiple right rib deformities consistent with fracture.  Impressions and exam heart magnified by technique.  Lungs are clear. Fractures are not visible on this study. No pneumothorax.    IMPRESSION: No acute findings.        Current Inpatient Medications:  acetaminophen     Tablet .. 650 milliGRAM(s) Oral every 6 hours PRN  gabapentin 100 milliGRAM(s) Oral three times a day  heparin   Injectable 5000 Unit(s) SubCutaneous every 12 hours  ibuprofen  Tablet. 400 milliGRAM(s) Oral every 6 hours PRN  lidocaine   4% Patch 1 Patch Transdermal daily  ondansetron Injectable 4 milliGRAM(s) IV Push every 6 hours PRN       Pt s/p mechanical fall, rib fx and r. shoulder dislocation (2024 14:29)    Pt seen and examined this am, pain controlled, shoulder in sling, using IS and on multimodal pain control, downgraded to floor yesterday , h/h stable, started on chemical DVT ppx   ROS negative except as above      Physical Exam:    GENERAL: NAD, AOx3, well developed  HEAD:  Atraumatic, Normocephalic  EYES: no right eye. EOMI, conjunctiva and sclera clear  NECK: Supple, No JVD  CHEST/LUNG: Unlabored respirations b/l  right shoulder in sling   CHEST WALL: tender to palpation at right anterior chest wall area. no ecchymosis/hematoma/external injury  HEART: S1 and S2 normal  ABDOMEN: soft, nondistended, nontender  EXTREMITIES:  2+ Peripheral Pulses, brisk capillary refill. No clubbing, cyanosis, or edema  NERVOUS SYSTEM:  AO X3, speech clear. No deficits   MSK: restricted ROM on right arm, on a sling. no deformities. otherwise FROM  SKIN: warm to touch. No rashes or lesions            Vitals:  T(C): 36.7 ( @ 00:00), Max: 36.7 (- @ 00:00)  HR: 65 ( @ 00:00) (65 - 81)  BP: 111/51 ( @ 00:00) (101/48 - 120/74)  RR: 16 ( @ 00:00) (15 - 18)  SpO2: 93% ( @ 00:00) (91% - 95%)       @ 07:01  -   @ 06:46  --------------------------------------------------------  IN:  Total IN: 0 mL    OUT:    Voided (mL): 400 mL  Total OUT: 400 mL    Total NET: -400 mL           @ 05:41                    10.1  CBC: 5.36>)-------(<144                     31.8                 142 | 111 | 17    CMP:  ----------------------< 106               4.0 | 27 | 0.88                      Ca:9.0  Phos:2.6  M.3               -|      |-        LFTs:  ------|-|-----             -|      |-      Interval imaging    CXR :      There is mild symmetric degeneration concentrated in the outer hips. There is lower lumbar facet degeneration. No fracture.  AP chest on 2024 at 7:53 AM. CAT scan one day prior showed multiple right rib deformities consistent with fracture.  Impressions and exam heart magnified by technique.  Lungs are clear. Fractures are not visible on this study. No pneumothorax.    IMPRESSION: No acute findings.        Current Inpatient Medications:  acetaminophen     Tablet .. 650 milliGRAM(s) Oral every 6 hours PRN  gabapentin 100 milliGRAM(s) Oral three times a day  heparin   Injectable 5000 Unit(s) SubCutaneous every 12 hours  ibuprofen  Tablet. 400 milliGRAM(s) Oral every 6 hours PRN  lidocaine   4% Patch 1 Patch Transdermal daily  ondansetron Injectable 4 milliGRAM(s) IV Push every 6 hours PRN

## 2024-04-09 NOTE — DISCHARGE NOTE PROVIDER - CARE PROVIDERS DIRECT ADDRESSES
,DirectAddress_Unknown ,[email or web address]

## 2024-04-09 NOTE — DISCHARGE NOTE PROVIDER - NSDCCPCAREPLAN_GEN_ALL_CORE_FT
PRINCIPAL DISCHARGE DIAGNOSIS  Diagnosis: Rib fractures  Assessment and Plan of Treatment:       SECONDARY DISCHARGE DIAGNOSES  Diagnosis: Dislocation of right shoulder joint  Assessment and Plan of Treatment:

## 2024-04-09 NOTE — DISCHARGE NOTE PROVIDER - PROVIDER TOKENS
PROVIDER:[TOKEN:[[id number]],FOLLOWUP:[Routine]] PROVIDER:[TOKEN:[[id number]],FOLLOWUP:[1 week]] PROVIDER:[TOKEN:[[id number]],FOLLOWUP:[1 week]]

## 2024-04-09 NOTE — PROGRESS NOTE ADULT - ASSESSMENT
89yo F presents s/p fall, no head trauma, no loc, no anticoag/antithrombotics  panCT demonstrated R. shoulder dislocation (s/p reduction) and R. 2 and 4-7 angulated rib fx      Plan:  - pain control multimodal  - monitor VS  - CT surgery on board, no surgical intervention  - diet: Reg  - antiemetic control prn  - IS  - orthopedic surgery for right shoulder dislocation> s/p reduction: keep sling   - GI/DVT prophylaxis  - encourage IS/OOB  - Physical therapy and social work  -home meds as appropriate, hospitalist on board        89yo F presents s/p fall, no head trauma, no loc, no anticoag/antithrombotics  panCT demonstrated R. shoulder dislocation (s/p reduction) and R. 2 and 4-7 angulated rib fx      Plan:  - pain control multimodal  - monitor VS  - CT surgery on board, no surgical intervention  - diet: Reg  - antiemetic control prn  - IS  - orthopedic surgery for right shoulder dislocation> s/p reduction: keep sling   - GI/DVT prophylaxis  - encourage IS/OOB  - Physical therapy and social work  -home meds as appropriate, hospitalist consult       89yo F presents s/p fall, no head trauma, no loc, no anticoag/antithrombotics  panCT demonstrated R. shoulder dislocation (s/p reduction) and R. 2 and 4-7 angulated rib fx      Plan:  - pain control multimodal  - monitor VS  - CT surgery on board, no surgical intervention  - diet: Reg  - antiemetic control prn  - IS  - orthopedic surgery for right shoulder dislocation> s/p reduction: keep sling   - GI/DVT prophylaxis  - encourage IS/OOB  - Physical therapy and social work  -home meds as appropriate, hospitalist consult    Attending A/P:    Chart reviewed, patient examined, agree with above resident evaluation in addition to the following    R shoulder dislocation s/p reduction and R sided rib fractures, doing well on floor, DC later today    PLAN:    GI/DVT prophylaxis  home meds as appropriate  Pain control  PT, Spirometer    Pt aware of and agrees with all of the above    35 minuted of time spent on pt examination, review of relevant labs and radiologic studies, assured stabilization of pt, discussion with relevant services/providers for coordination of pt care and services       89yo F presents s/p fall, no head trauma, no loc, no anticoag/antithrombotics  panCT demonstrated R. shoulder dislocation (s/p reduction) and R. 2 and 4-7 angulated rib fx      Plan:  - pain control multimodal  - monitor VS  - CT surgery on board, no surgical intervention  - diet: Reg  - antiemetic control prn  - IS  - orthopedic surgery for right shoulder dislocation> s/p reduction: keep sling   - GI/DVT prophylaxis  - encourage IS/OOB  - Physical therapy and social work  -home meds as appropriate, hospitalist consult    Attending A/P:    Chart reviewed, patient examined, agree with above resident evaluation in addition to the following    R shoulder dislocation s/p reduction and R sided rib fractures, doing well on floor, DC later today  Discussed with ortho resident over phone, shoulder was reduced by ER team and post-reduction xrays appear show appropriate alignment per ortho. They recommend follow with Dr. Roger matos.      Pt aware of and agrees with all of the above    35 minuted of time spent on pt examination, review of relevant labs and radiologic studies, assured stabilization of pt, discussion with relevant services/providers for coordination of pt care and services

## 2024-04-09 NOTE — DISCHARGE NOTE PROVIDER - NSDCFUADDINST_GEN_ALL_CORE_FT
Please use incentive spirometer at home 10 times an hour until your pain is improved. Please use incentive spirometer at home 10 times an hour until your pain is improved.  Please excuse the son of Ms. Mullen from work for one week as he is caring for his mother after an injury and hospitalization. Call [facility] Surgery team with any questions.

## 2024-04-09 NOTE — DISCHARGE NOTE PROVIDER - ATTENDING DISCHARGE PHYSICAL EXAMINATION:
GENERAL: NAD, AOx3, well developed  HEAD:  Atraumatic, Normocephalic  EYES: no right eye. EOMI, conjunctiva and sclera clear  NECK: Supple, No JVD  CHEST/LUNG: Unlabored respirations b/l  right shoulder in sling   CHEST WALL: tender to palpation at right anterior chest wall area. no ecchymosis/hematoma/external injury  HEART: S1 and S2 normal  ABDOMEN: soft, nondistended, nontender  EXTREMITIES:  2+ Peripheral Pulses, brisk capillary refill. No clubbing, cyanosis, or edema  NERVOUS SYSTEM:  AO X3, speech clear. No deficits   MSK: restricted ROM on right arm, on a sling. no deformities. otherwise FROM  SKIN: warm to touch. No rashes or lesions

## 2024-04-09 NOTE — DISCHARGE NOTE PROVIDER - HOSPITAL COURSE
Pt was admitted as a trauma and was found to have right rib fractures and a shoulder dislocation. Pts pain is controlled and ambulating with PT. Pt is safe for discharge home with home PT. Pt was admitted as a trauma and was found to have right rib fractures and a shoulder dislocation reduced by ortho. Pts pain is controlled and ambulating with PT. Pt is safe for discharge home with home PT. Pt was admitted as a trauma and was found to have right rib fractures and a shoulder dislocation reduced. Pts pain is controlled and ambulating with PT. Pt is safe for discharge home with home PT.

## 2024-04-09 NOTE — DISCHARGE NOTE NURSING/CASE MANAGEMENT/SOCIAL WORK - PATIENT PORTAL LINK FT
You can access the FollowMyHealth Patient Portal offered by [facility] by registering at the following website: [email or web address]. By joining [employer]’s FollowMyHealth portal, you will also be able to view your health information using other applications (apps) compatible with our system.

## 2024-04-16 DIAGNOSIS — S22.41XA MULTIPLE FRACTURES OF RIBS, RIGHT SIDE, INITIAL ENCOUNTER FOR CLOSED FRACTURE: ICD-10-CM

## 2024-04-16 DIAGNOSIS — S43.004A UNSPECIFIED DISLOCATION OF RIGHT SHOULDER JOINT, INITIAL ENCOUNTER: ICD-10-CM
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
## 2025-06-09 NOTE — DISCHARGE NOTE NURSING/CASE MANAGEMENT/SOCIAL WORK - INFLUENZA IMMUNIZATION DATE (APPROXIMATE):
":  Assessment & Plan  Plantar fasciitis, bilateral    Orders:    predniSONE 20 mg tablet; Take 1 tablet (20 mg total) by mouth daily    Pain in both feet         The patient was seen and examined today.  There is some mild residual tenderness palpation to the plantar medial tubercles of the os calcis bilaterally, left more so than the right.  There is no tenderness with squeeze of the calcaneus to either foot.  There is no erythema nor edema no calor or ecchymosis.  Pedal pulses palpable.    The patient's clinical examination is consistent with resolving plantar fasciitis bilaterally.  She has responded well to injection therapy.  There is still some very mild residual tenderness along the plantar fascia on both feet.  Treatment options were discussed that she will like to start with a trial of oral prednisone therapy for its anti-inflammatory effect.  A prescription for prednisone 20 mg once a day for 10 days was sent to her pharmacy.  Continue stretching exercises and supportive shoe gear.    Recommend follow-up in 4 to 6 weeks.    History of Present Illness     Elis Patterson is a 37 y.o. female   The patient presents today for follow-up of bilateral heel pain secondary to plantar fasciitis.  The patient had received injection therapy on her left foot after her last visit.  She does note full improvement.  Pain is relatively mild at this point in time.  Overall she feels that her heels are much better.  There is still some mild tenderness when getting up after periods of rest.      Review of Systems   Constitutional: Negative.    HENT: Negative.     Eyes: Negative.    Respiratory: Negative.     Cardiovascular: Negative.    Endocrine: Negative.    Musculoskeletal: Negative.    Neurological: Negative.    Hematological: Negative.    Psychiatric/Behavioral: Negative.       Objective   Pulse 85   Ht 5' 1\" (1.549 m)   Wt 70.8 kg (156 lb)   LMP 09/26/2023   SpO2 97%   BMI 29.48 kg/m²      Physical Exam  Vitals " and nursing note reviewed.   Constitutional:       General: She is not in acute distress.     Appearance: She is well-developed.   HENT:      Head: Normocephalic and atraumatic.     Eyes:      Conjunctiva/sclera: Conjunctivae normal.       Cardiovascular:      Pulses:           Dorsalis pedis pulses are 2+ on the right side and 2+ on the left side.        Posterior tibial pulses are 2+ on the right side and 2+ on the left side.   Pulmonary:      Effort: Pulmonary effort is normal.   Feet:      Right foot:      Skin integrity: Skin integrity normal.      Left foot:      Skin integrity: Skin integrity normal.      Comments: The patient was seen and examined today.  There is some mild residual tenderness palpation to the plantar medial tubercles of the os calcis bilaterally, left more so than the right.  There is no tenderness with squeeze of the calcaneus to either foot.  There is no erythema nor edema no calor or ecchymosis.  Pedal pulses palpable.      Skin:     General: Skin is warm and dry.      Capillary Refill: Capillary refill takes less than 2 seconds.     Neurological:      General: No focal deficit present.      Mental Status: She is alert and oriented to person, place, and time.     Psychiatric:         Mood and Affect: Mood normal.            11-Oct-2023